# Patient Record
Sex: MALE | Race: WHITE | Employment: FULL TIME | URBAN - METROPOLITAN AREA
[De-identification: names, ages, dates, MRNs, and addresses within clinical notes are randomized per-mention and may not be internally consistent; named-entity substitution may affect disease eponyms.]

---

## 2020-09-04 ENCOUNTER — APPOINTMENT (OUTPATIENT)
Dept: CT IMAGING | Age: 25
End: 2020-09-04
Payer: COMMERCIAL

## 2020-09-04 ENCOUNTER — HOSPITAL ENCOUNTER (EMERGENCY)
Age: 25
Discharge: HOME | End: 2020-09-05
Attending: EMERGENCY MEDICINE
Payer: COMMERCIAL

## 2020-09-04 LAB
ANION GAP SERPL CALCULATED.3IONS-SCNC: 14 MMOL/L (ref 3–16)
BASOPHILS ABSOLUTE: 0.1 K/UL (ref 0–0.2)
BASOPHILS RELATIVE PERCENT: 1 %
BUN BLDV-MCNC: 7 MG/DL (ref 7–20)
CALCIUM SERPL-MCNC: 8.4 MG/DL (ref 8.3–10.6)
CHLORIDE BLD-SCNC: 105 MMOL/L (ref 99–110)
CO2: 23 MMOL/L (ref 21–32)
CREAT SERPL-MCNC: 0.8 MG/DL (ref 0.9–1.3)
EOSINOPHILS ABSOLUTE: 0 K/UL (ref 0–0.6)
EOSINOPHILS RELATIVE PERCENT: 0.4 %
ETHANOL: 491 MG/DL (ref 0–0.08)
GFR AFRICAN AMERICAN: >60
GFR NON-AFRICAN AMERICAN: >60
GLUCOSE BLD-MCNC: 100 MG/DL (ref 70–99)
HCT VFR BLD CALC: 43.6 % (ref 40.5–52.5)
HEMOGLOBIN: 14.7 G/DL (ref 13.5–17.5)
LYMPHOCYTES ABSOLUTE: 2.5 K/UL (ref 1–5.1)
LYMPHOCYTES RELATIVE PERCENT: 41.8 %
MCH RBC QN AUTO: 35.7 PG (ref 26–34)
MCHC RBC AUTO-ENTMCNC: 33.7 G/DL (ref 31–36)
MCV RBC AUTO: 106.2 FL (ref 80–100)
MONOCYTES ABSOLUTE: 0.5 K/UL (ref 0–1.3)
MONOCYTES RELATIVE PERCENT: 8.4 %
NEUTROPHILS ABSOLUTE: 2.9 K/UL (ref 1.7–7.7)
NEUTROPHILS RELATIVE PERCENT: 48.4 %
PDW BLD-RTO: 12.6 % (ref 12.4–15.4)
PLATELET # BLD: 291 K/UL (ref 135–450)
PMV BLD AUTO: 6.8 FL (ref 5–10.5)
POTASSIUM SERPL-SCNC: 4.1 MMOL/L (ref 3.5–5.1)
RBC # BLD: 4.11 M/UL (ref 4.2–5.9)
SODIUM BLD-SCNC: 142 MMOL/L (ref 136–145)
WBC # BLD: 6 K/UL (ref 4–11)

## 2020-09-04 PROCEDURE — G0480 DRUG TEST DEF 1-7 CLASSES: HCPCS

## 2020-09-04 PROCEDURE — 99284 EMERGENCY DEPT VISIT MOD MDM: CPT

## 2020-09-04 PROCEDURE — 85025 COMPLETE CBC W/AUTO DIFF WBC: CPT

## 2020-09-04 PROCEDURE — 80048 BASIC METABOLIC PNL TOTAL CA: CPT

## 2020-09-04 PROCEDURE — 96374 THER/PROPH/DIAG INJ IV PUSH: CPT

## 2020-09-04 PROCEDURE — 2580000003 HC RX 258: Performed by: EMERGENCY MEDICINE

## 2020-09-04 PROCEDURE — 6360000002 HC RX W HCPCS: Performed by: EMERGENCY MEDICINE

## 2020-09-04 RX ORDER — ONDANSETRON 2 MG/ML
4 INJECTION INTRAMUSCULAR; INTRAVENOUS ONCE
Status: COMPLETED | OUTPATIENT
Start: 2020-09-04 | End: 2020-09-04

## 2020-09-04 RX ORDER — SODIUM CHLORIDE, SODIUM LACTATE, POTASSIUM CHLORIDE, AND CALCIUM CHLORIDE .6; .31; .03; .02 G/100ML; G/100ML; G/100ML; G/100ML
2000 INJECTION, SOLUTION INTRAVENOUS ONCE
Status: COMPLETED | OUTPATIENT
Start: 2020-09-04 | End: 2020-09-05

## 2020-09-04 RX ADMIN — ONDANSETRON 4 MG: 2 INJECTION INTRAMUSCULAR; INTRAVENOUS at 23:15

## 2020-09-04 RX ADMIN — SODIUM CHLORIDE, POTASSIUM CHLORIDE, SODIUM LACTATE AND CALCIUM CHLORIDE 2000 ML: 600; 310; 30; 20 INJECTION, SOLUTION INTRAVENOUS at 23:15

## 2020-09-05 VITALS
DIASTOLIC BLOOD PRESSURE: 105 MMHG | SYSTOLIC BLOOD PRESSURE: 139 MMHG | TEMPERATURE: 97.7 F | RESPIRATION RATE: 18 BRPM | HEART RATE: 77 BPM | OXYGEN SATURATION: 100 %

## 2020-09-05 RX ORDER — SODIUM CHLORIDE, SODIUM LACTATE, POTASSIUM CHLORIDE, AND CALCIUM CHLORIDE .6; .31; .03; .02 G/100ML; G/100ML; G/100ML; G/100ML
1000 INJECTION, SOLUTION INTRAVENOUS ONCE
Status: DISCONTINUED | OUTPATIENT
Start: 2020-09-05 | End: 2020-09-05 | Stop reason: HOSPADM

## 2020-09-05 NOTE — ED NOTES
Patient refusing to have cardiac leads, pulse ox, and BP cuff on. Despite several attempts at redirection, patient continues to rip off all monitoring wires. Girlfriend at bedside.       Anastasia Marvin RN  09/05/20 1830

## 2020-09-05 NOTE — ED NOTES
Patient continues to be argumentative with staff. Patient attempting to climb out of bed. Patient redirected back to bed.       Abeba Chawla RN  09/05/20 4730

## 2020-09-05 NOTE — ED NOTES
Patient drank second liter of ice water and ate bag of cheez-it crackers. Patient less belligerent than earlier and is not requiring as much redirection. Mother and girlfriend remain at the bedside.       Sahara Dial RN  09/05/20 6625

## 2020-09-05 NOTE — ED PROVIDER NOTES
1 [facility]  EMERGENCY DEPARTMENT ENCOUNTER          ATTENDING PHYSICIAN NOTE       Date of evaluation: 9/4/2020    Chief Complaint     Alcohol Intoxication      History of Present Illness     Davina Garcia is a 30 y.o. male with no significant past medical history who presents by EMS after he was found unresponsive at a bar. According to EMS he was passed out in a bar stool. No indication that he had fallen or hit his head. They are unaware of any significant past medical history. He is unable to provide additional history. Review of Systems     Unable to obtain secondary to patients altered mental status. Past Medical, Surgical, Family, and Social History     He has no past medical history on file. He has no past surgical history on file. His family history is not on file. He reports that he has never smoked. He has never used smokeless tobacco. He reports current alcohol use. He reports that he does not use drugs. Medications     Previous Medications    No medications on file       Allergies     He has No Known Allergies. Physical Exam     INITIAL VITALS: BP: (!) 139/100, Temp: 97.7 °F (36.5 °C), Pulse: 112, Resp: 21, SpO2: 98 %   Gen:  Well developed, well nourished, non-toxic, no acute distress. HENT:  Normocephalic atraumatic, conjunctiva pink, mucous membranes moist  Neck:  Supple w/o thyromegaly, lymphadenopathy, or JVD  Resp:  Lungs clear to auscultation and equal bilaterally. No adventitious sounds. CV: Regular rate and rhythm, no murmurs, rubs, or gallops  GI:  Soft, non-tender, non-distended. No rebound, guarding, or peritoneal signs. MSK:  No cyanosis, clubbing, or edema  Skin: warm and dry, no rash  Neuro: Will open eyes to voice. Nonverbal.  Will weakly follow commands in upper and lower extremities. PERRL 5->3mm bilaterally.   Psych:  Normal Mood        Diagnostic Results     RADIOLOGY:  No orders to display       LABS:   Results for orders placed or performed during the hospital encounter of 09/04/20   CBC auto differential   Result Value Ref Range    WBC 6.0 4.0 - 11.0 K/uL    RBC 4.11 (L) 4.20 - 5.90 M/uL    Hemoglobin 14.7 13.5 - 17.5 g/dL    Hematocrit 43.6 40.5 - 52.5 %    .2 (H) 80.0 - 100.0 fL    MCH 35.7 (H) 26.0 - 34.0 pg    MCHC 33.7 31.0 - 36.0 g/dL    RDW 12.6 12.4 - 15.4 %    Platelets 038 517 - 170 K/uL    MPV 6.8 5.0 - 10.5 fL    Neutrophils % 48.4 %    Lymphocytes % 41.8 %    Monocytes % 8.4 %    Eosinophils % 0.4 %    Basophils % 1.0 %    Neutrophils Absolute 2.9 1.7 - 7.7 K/uL    Lymphocytes Absolute 2.5 1.0 - 5.1 K/uL    Monocytes Absolute 0.5 0.0 - 1.3 K/uL    Eosinophils Absolute 0.0 0.0 - 0.6 K/uL    Basophils Absolute 0.1 0.0 - 0.2 K/uL   Basic Metabolic Panel (EP - 1)   Result Value Ref Range    Sodium 142 136 - 145 mmol/L    Potassium 4.1 3.5 - 5.1 mmol/L    Chloride 105 99 - 110 mmol/L    CO2 23 21 - 32 mmol/L    Anion Gap 14 3 - 16    Glucose 100 (H) 70 - 99 mg/dL    BUN 7 7 - 20 mg/dL    CREATININE 0.8 (L) 0.9 - 1.3 mg/dL    GFR Non-African American >60 >60    GFR African American >60 >60    Calcium 8.4 8.3 - 10.6 mg/dL   Ethanol   Result Value Ref Range    Ethanol Lvl 491 mg/dL         RECENT VITALS:  BP: (!) 139/105, Temp: 97.7 °F (36.5 °C), Pulse: 77, Resp: 18, SpO2: 100 %     ED Course     Nursing Notes, Past Medical Hx, Past Surgical Hx, Social Hx, Allergies, and Family Hx were reviewed. The patient was given the following medications:  Orders Placed This Encounter   Medications    lactated ringers bolus    ondansetron (ZOFRAN) injection 4 mg    lactated ringers bolus       CONSULTS:  None    MEDICAL DECISION MAKING / ASSESSMENT / Chaya Pichardo is a 30 y.o. male who presented to the ED after he was noted to be passed out at the bar. He was initially very somnolent, but would weakly follow commands. He had no significant electrolyte abnormalities and was not anemic. EtOH returned at 491.   I had initially ordered a head CT, but he quickly woke up and was conversant. There was no signs of trauma or indication that he had fallen so I did not feel this was necessary. He was given 3L of IVF and he drank fluids for tachycardia, which I feel was related to dehydration which improved. He was very belligerent and required multiple attempts by myself as well as the bedside nurses and [facility]'s deputy to get him to lay in bed until he was clinically sober. The patient was observed in the ED and at this time he is clinically sober. His mother is at the bedside and will drive him home. I feel he is safe for discharge with return precautions. Clinical Impression     1. Alcoholic intoxication without complication ([address]        Disposition     PATIENT REFERRED TO:  No follow-up provider specified.     DISCHARGE MEDICATIONS:  New Prescriptions    No medications on file       DISPOSITION            Yobani Laird MD  09/05/20 5815